# Patient Record
(demographics unavailable — no encounter records)

---

## 2025-07-14 NOTE — HISTORY OF PRESENT ILLNESS
[Delivery Date: ___] : on [unfilled] [Female] : Delivery History: baby girl [Girl] : baby is a girl [Infant's Name ___] : [unfilled] [___ Lbs] : [unfilled] lbs [___ Oz] : [unfilled] oz [Living at Home] : is currently living at home [Postpartum Follow Up] : postpartum follow up [Complications:___] : no complications [] : delivered by vaginal delivery [Wt. ___] : weighing [unfilled] [Breastfeeding] : currently nursing [Resumed Menses] : has not resumed her menses [Resumed [place]] : has not resumed intercourse [Intended Contraception] : the patient does not intended to use contraception postpartum [S/Sx PP Depression] : no signs/symptoms of postpartum depression [Erythema] : not erythematous [Healed] : healed [Back to Normal] : is back to normal in size [Normal] : the vagina was normal [Cervix Sample Taken] : cervical sample not taken for a Pap smear [Examination Of The Breasts] : breasts are normal [Doing Well] : is doing well [No Sign of Infection] : is showing no signs of infection [Excellent Pain Control] : has excellent pain control [None] : None [FreeTextEntry8] : 35 y.o. now P 3013 s/p RLST C/S  6/1/25  live female " Dixie", 7lbs 14oz. now 6 weeks PP. pt is breastfeeding. . pt feels well. pt had bilateral partial salpingectomy at time of C/S.  [de-identified] : 6 weeks PP s/p RLST C/S  and bilateral salpingectomy [de-identified] : f/u prn or 1 year for annual